# Patient Record
Sex: MALE | Race: WHITE | NOT HISPANIC OR LATINO | Employment: OTHER | URBAN - METROPOLITAN AREA
[De-identification: names, ages, dates, MRNs, and addresses within clinical notes are randomized per-mention and may not be internally consistent; named-entity substitution may affect disease eponyms.]

---

## 2023-04-22 ENCOUNTER — HOSPITAL ENCOUNTER (OUTPATIENT)
Facility: HOSPITAL | Age: 63
Setting detail: OBSERVATION
Discharge: HOME | End: 2023-04-24
Attending: EMERGENCY MEDICINE | Admitting: STUDENT IN AN ORGANIZED HEALTH CARE EDUCATION/TRAINING PROGRAM

## 2023-04-22 ENCOUNTER — APPOINTMENT (EMERGENCY)
Dept: RADIOLOGY | Facility: HOSPITAL | Age: 63
End: 2023-04-22

## 2023-04-22 ENCOUNTER — APPOINTMENT (OUTPATIENT)
Dept: NON INVASIVE DIAGNOSTICS | Facility: HOSPITAL | Age: 63
End: 2023-04-22

## 2023-04-22 DIAGNOSIS — N17.9 ACUTE KIDNEY INJURY (HCC): Primary | ICD-10-CM

## 2023-04-22 DIAGNOSIS — D72.829 LEUKOCYTOSIS: ICD-10-CM

## 2023-04-22 DIAGNOSIS — R06.00 DYSPNEA: ICD-10-CM

## 2023-04-22 DIAGNOSIS — E86.0 DEHYDRATION: ICD-10-CM

## 2023-04-22 DIAGNOSIS — R55 NEAR SYNCOPE: ICD-10-CM

## 2023-04-22 PROBLEM — R06.02 SOB (SHORTNESS OF BREATH): Status: ACTIVE | Noted: 2023-04-22

## 2023-04-22 PROBLEM — I10 HTN (HYPERTENSION): Status: ACTIVE | Noted: 2023-04-22

## 2023-04-22 PROBLEM — R65.10 SIRS (SYSTEMIC INFLAMMATORY RESPONSE SYNDROME) (HCC): Status: ACTIVE | Noted: 2023-04-22

## 2023-04-22 LAB
2HR DELTA HS TROPONIN: -1 NG/L
4HR DELTA HS TROPONIN: 0 NG/L
ALBUMIN SERPL BCP-MCNC: 4.7 G/DL (ref 3.5–5)
ALP SERPL-CCNC: 62 U/L (ref 34–104)
ALT SERPL W P-5'-P-CCNC: 44 U/L (ref 7–52)
ANION GAP SERPL CALCULATED.3IONS-SCNC: 11 MMOL/L (ref 4–13)
APTT PPP: 23 SECONDS (ref 23–37)
AST SERPL W P-5'-P-CCNC: 28 U/L (ref 13–39)
BACTERIA UR QL AUTO: ABNORMAL /HPF
BASOPHILS # BLD AUTO: 0.08 THOUSANDS/ΜL (ref 0–0.1)
BASOPHILS NFR BLD AUTO: 1 % (ref 0–1)
BILIRUB SERPL-MCNC: 0.49 MG/DL (ref 0.2–1)
BILIRUB UR QL STRIP: NEGATIVE
BNP SERPL-MCNC: 9 PG/ML (ref 0–100)
BUN SERPL-MCNC: 32 MG/DL (ref 5–25)
CALCIUM SERPL-MCNC: 10.3 MG/DL (ref 8.4–10.2)
CARDIAC TROPONIN I PNL SERPL HS: 13 NG/L
CARDIAC TROPONIN I PNL SERPL HS: 14 NG/L
CARDIAC TROPONIN I PNL SERPL HS: 14 NG/L
CHLORIDE SERPL-SCNC: 98 MMOL/L (ref 96–108)
CLARITY UR: CLEAR
CO2 SERPL-SCNC: 25 MMOL/L (ref 21–32)
COLOR UR: YELLOW
CREAT SERPL-MCNC: 2.37 MG/DL (ref 0.6–1.3)
D DIMER PPP FEU-MCNC: 0.58 UG/ML FEU
EOSINOPHIL # BLD AUTO: 0.05 THOUSAND/ΜL (ref 0–0.61)
EOSINOPHIL NFR BLD AUTO: 0 % (ref 0–6)
ERYTHROCYTE [DISTWIDTH] IN BLOOD BY AUTOMATED COUNT: 13.5 % (ref 11.6–15.1)
FLUAV RNA RESP QL NAA+PROBE: NEGATIVE
FLUBV RNA RESP QL NAA+PROBE: NEGATIVE
GFR SERPL CREATININE-BSD FRML MDRD: 28 ML/MIN/1.73SQ M
GLUCOSE SERPL-MCNC: 93 MG/DL (ref 65–140)
GLUCOSE UR STRIP-MCNC: NEGATIVE MG/DL
GRAN CASTS #/AREA URNS LPF: ABNORMAL /[LPF]
HCT VFR BLD AUTO: 43.8 % (ref 36.5–49.3)
HGB BLD-MCNC: 14 G/DL (ref 12–17)
HGB UR QL STRIP.AUTO: NEGATIVE
HYALINE CASTS #/AREA URNS LPF: ABNORMAL /LPF
IMM GRANULOCYTES # BLD AUTO: 0.15 THOUSAND/UL (ref 0–0.2)
IMM GRANULOCYTES NFR BLD AUTO: 1 % (ref 0–2)
INR PPP: 0.87 (ref 0.84–1.19)
KETONES UR STRIP-MCNC: NEGATIVE MG/DL
LEUKOCYTE ESTERASE UR QL STRIP: NEGATIVE
LYMPHOCYTES # BLD AUTO: 1.97 THOUSANDS/ΜL (ref 0.6–4.47)
LYMPHOCYTES NFR BLD AUTO: 12 % (ref 14–44)
MCH RBC QN AUTO: 29.1 PG (ref 26.8–34.3)
MCHC RBC AUTO-ENTMCNC: 32 G/DL (ref 31.4–37.4)
MCV RBC AUTO: 91 FL (ref 82–98)
MONOCYTES # BLD AUTO: 1.45 THOUSAND/ΜL (ref 0.17–1.22)
MONOCYTES NFR BLD AUTO: 9 % (ref 4–12)
MUCOUS THREADS UR QL AUTO: ABNORMAL
NEUTROPHILS # BLD AUTO: 13 THOUSANDS/ΜL (ref 1.85–7.62)
NEUTS SEG NFR BLD AUTO: 77 % (ref 43–75)
NITRITE UR QL STRIP: NEGATIVE
NON-SQ EPI CELLS URNS QL MICRO: ABNORMAL /HPF
NRBC BLD AUTO-RTO: 0 /100 WBCS
PH UR STRIP.AUTO: 5.5 [PH]
PLATELET # BLD AUTO: 282 THOUSANDS/UL (ref 149–390)
PMV BLD AUTO: 9.7 FL (ref 8.9–12.7)
POTASSIUM SERPL-SCNC: 5 MMOL/L (ref 3.5–5.3)
PROCALCITONIN SERPL-MCNC: 0.21 NG/ML
PROT SERPL-MCNC: 7.8 G/DL (ref 6.4–8.4)
PROT UR STRIP-MCNC: ABNORMAL MG/DL
PROTHROMBIN TIME: 12.5 SECONDS (ref 11.6–14.5)
RBC # BLD AUTO: 4.81 MILLION/UL (ref 3.88–5.62)
RBC #/AREA URNS AUTO: ABNORMAL /HPF
RSV RNA RESP QL NAA+PROBE: NEGATIVE
SARS-COV-2 RNA RESP QL NAA+PROBE: NEGATIVE
SODIUM SERPL-SCNC: 134 MMOL/L (ref 135–147)
SP GR UR STRIP.AUTO: 1.01 (ref 1–1.03)
UROBILINOGEN UR STRIP-ACNC: <2 MG/DL
WBC # BLD AUTO: 16.7 THOUSAND/UL (ref 4.31–10.16)
WBC #/AREA URNS AUTO: ABNORMAL /HPF

## 2023-04-22 RX ORDER — LISINOPRIL 20 MG/1
1 TABLET ORAL DAILY
COMMUNITY
Start: 2023-04-05

## 2023-04-22 RX ORDER — LANOLIN ALCOHOL/MO/W.PET/CERES
3 CREAM (GRAM) TOPICAL DAILY PRN
Status: DISCONTINUED | OUTPATIENT
Start: 2023-04-22 | End: 2023-04-24 | Stop reason: HOSPADM

## 2023-04-22 RX ORDER — SODIUM CHLORIDE, SODIUM GLUCONATE, SODIUM ACETATE, POTASSIUM CHLORIDE, MAGNESIUM CHLORIDE, SODIUM PHOSPHATE, DIBASIC, AND POTASSIUM PHOSPHATE .53; .5; .37; .037; .03; .012; .00082 G/100ML; G/100ML; G/100ML; G/100ML; G/100ML; G/100ML; G/100ML
500 INJECTION, SOLUTION INTRAVENOUS ONCE
Status: COMPLETED | OUTPATIENT
Start: 2023-04-22 | End: 2023-04-22

## 2023-04-22 RX ORDER — ONDANSETRON 2 MG/ML
4 INJECTION INTRAMUSCULAR; INTRAVENOUS EVERY 6 HOURS PRN
Status: DISCONTINUED | OUTPATIENT
Start: 2023-04-22 | End: 2023-04-24 | Stop reason: HOSPADM

## 2023-04-22 RX ORDER — ACETAMINOPHEN 325 MG/1
650 TABLET ORAL EVERY 6 HOURS PRN
Status: DISCONTINUED | OUTPATIENT
Start: 2023-04-22 | End: 2023-04-24 | Stop reason: HOSPADM

## 2023-04-22 RX ORDER — SODIUM CHLORIDE, SODIUM GLUCONATE, SODIUM ACETATE, POTASSIUM CHLORIDE, MAGNESIUM CHLORIDE, SODIUM PHOSPHATE, DIBASIC, AND POTASSIUM PHOSPHATE .53; .5; .37; .037; .03; .012; .00082 G/100ML; G/100ML; G/100ML; G/100ML; G/100ML; G/100ML; G/100ML
125 INJECTION, SOLUTION INTRAVENOUS CONTINUOUS
Status: DISCONTINUED | OUTPATIENT
Start: 2023-04-22 | End: 2023-04-22

## 2023-04-22 RX ORDER — SODIUM CHLORIDE, SODIUM GLUCONATE, SODIUM ACETATE, POTASSIUM CHLORIDE, MAGNESIUM CHLORIDE, SODIUM PHOSPHATE, DIBASIC, AND POTASSIUM PHOSPHATE .53; .5; .37; .037; .03; .012; .00082 G/100ML; G/100ML; G/100ML; G/100ML; G/100ML; G/100ML; G/100ML
125 INJECTION, SOLUTION INTRAVENOUS CONTINUOUS
Status: DISPENSED | OUTPATIENT
Start: 2023-04-22 | End: 2023-04-23

## 2023-04-22 RX ORDER — HEPARIN SODIUM 5000 [USP'U]/ML
5000 INJECTION, SOLUTION INTRAVENOUS; SUBCUTANEOUS EVERY 8 HOURS SCHEDULED
Status: DISCONTINUED | OUTPATIENT
Start: 2023-04-23 | End: 2023-04-24 | Stop reason: HOSPADM

## 2023-04-22 RX ADMIN — SODIUM CHLORIDE, SODIUM GLUCONATE, SODIUM ACETATE, POTASSIUM CHLORIDE, MAGNESIUM CHLORIDE, SODIUM PHOSPHATE, DIBASIC, AND POTASSIUM PHOSPHATE 500 ML: .53; .5; .37; .037; .03; .012; .00082 INJECTION, SOLUTION INTRAVENOUS at 19:50

## 2023-04-22 RX ADMIN — SODIUM CHLORIDE, SODIUM GLUCONATE, SODIUM ACETATE, POTASSIUM CHLORIDE, MAGNESIUM CHLORIDE, SODIUM PHOSPHATE, DIBASIC, AND POTASSIUM PHOSPHATE 125 ML/HR: .53; .5; .37; .037; .03; .012; .00082 INJECTION, SOLUTION INTRAVENOUS at 21:40

## 2023-04-22 RX ADMIN — SODIUM CHLORIDE 1000 ML: 0.9 INJECTION, SOLUTION INTRAVENOUS at 16:05

## 2023-04-22 RX ADMIN — SODIUM CHLORIDE, SODIUM GLUCONATE, SODIUM ACETATE, POTASSIUM CHLORIDE, MAGNESIUM CHLORIDE, SODIUM PHOSPHATE, DIBASIC, AND POTASSIUM PHOSPHATE 125 ML/HR: .53; .5; .37; .037; .03; .012; .00082 INJECTION, SOLUTION INTRAVENOUS at 18:31

## 2023-04-22 NOTE — H&P
[place]  H&P  Name: Eliot Carter 58 y o  male I MRN: [id number]  Unit/Bed#: -01 I Date of Admission: 4/22/2023   Date of Service: 4/22/2023 I Hospital Day: 0      Assessment/Plan   * SOB (shortness of breath)  Assessment & Plan  · Presented to ED after becoming lightheaded with SOB while mowing lawn today  Sx resolved, stable on RA  · Covid/flu/rsv pending   · Meeting SIRS however suspect due to dehydration > infection   · Initial trop WNL, continue trend   · BNP WNL   · D dimer slightly elevated to 0 58 however with age adjustment, low likelihood VTE  · No clinical suspicion for D dimer, hold off on VQ scan   · Suspect symptoms due to pre-syncope in setting of acute dehydration with SHITAL, continue IV hydration    SHITAL (acute kidney injury) ([facility] [address] )  Assessment & Plan  ·  Baseline Cr 1-1 1  · Cr on admission 2 37  · Suspected pre-renal etiology/acute dehydration  · UA pending   · IVF  · Hold lisinopril   · retention protocol/bladder scan  · If no improvement with IVF consider renal US    SIRS (systemic inflammatory response syndrome) ([facility])  Assessment & Plan  · POA, SIRS; WBC 16, tachycardia   · No identifiable source currently, does not meet sepsis  · procal pending  · monitor off abx  · CXR pending final read however no evidence infection   · UA pending     HTN (hypertension)  Assessment & Plan  · Hold home lisinopril 2/2 SHITAL       VTE Pharmacologic Prophylaxis: VTE Score: 3 Moderate Risk (Score 3-4) - Pharmacological DVT Prophylaxis Ordered: heparin  Code Status: Level 1 - Full Code per patient   Discussion with family: Updated  (wife) at bedside  Anticipated Length of Stay: Patient will be admitted on an observation basis with an anticipated length of stay of less than 2 midnights secondary to IVF      Total Time Spent on Date of Encounter in care of patient: 65 minutes This time was spent on one or more of the following: performing physical exam; counseling "and coordination of care; obtaining or reviewing history; documenting in the medical record; reviewing/ordering tests, medications or procedures; communicating with other healthcare professionals and discussing with patient's family/caregivers  Chief Complaint: SOB, lightheadedness    History of Present Illness:  Amaris Staley is a 58 y o  male with a PMH of HTN, obesity who presents with new onset lightheadedness with associated shortness of breath and neck pain which began while patient was mowing the lawn today  Patient states he woke up in normal state of health this morning, ate breakfast, began mowing the lawn earlier in the day and was initially feeling fine, states he was trying to keep hydrated and wife brought patient Gatorade while taking a break from mowing  Patient states as he continued mowing lawn, he began to feel lightheaded/dizzy, visual changes including \" everything looked very light/bright\", and associated nausea without vomiting  He notes he also began feeling short of breath at which time wife took patient to urgent care who recommended ED  Shortness of breath and dizziness resolved with cessation of activity  Patient denies associated chest pain, palpitations, fevers or chills, recent sickness, vomiting, diarrhea, dysuria, blood in urine, flank pain, numbness/tingling/weakness in extremities or additional complaints  Currently in ED, patient states he feels mildly nauseous/hungry as he has not eaten all day  He denies any history of renal disease in the past, he recently had lab work done routinely with PCP at which time renal function was normal      Review of Systems:  Review of Systems   Constitutional: Positive for fatigue  Negative for chills and fever  HENT: Negative for congestion, rhinorrhea and sore throat  Eyes: Negative for visual disturbance  Respiratory: Positive for shortness of breath  Negative for cough, chest tightness and wheezing      Cardiovascular: " Negative for chest pain, palpitations and leg swelling  Gastrointestinal: Positive for nausea  Negative for abdominal pain, constipation, diarrhea and vomiting  Genitourinary: Negative for difficulty urinating, dysuria and frequency  Musculoskeletal: Positive for neck pain  Negative for arthralgias and myalgias  Skin: Negative for rash and wound  Neurological: Positive for dizziness and light-headedness  Negative for tremors, seizures, syncope, speech difficulty, weakness, numbness and headaches  All other systems reviewed and are negative  Past Medical and Surgical History:   History reviewed  No pertinent past medical history  History reviewed  No pertinent surgical history  Meds/Allergies:  Prior to Admission medications    Medication Sig Start Date End Date Taking? Authorizing Provider   lisinopril (ZESTRIL) 20 mg tablet Take 1 tablet by mouth daily 4/5/23  Yes Historical Provider, MD   Ascorbic Acid, Vitamin C, (VITAMIN C) 100 MG tablet Take 100 mg by mouth daily    Historical Provider, MD     I have reviewed home medications with patient personally  Allergies: Allergies   Allergen Reactions   • Penicillin G Hives       Social History:  Marital Status: /Civil Union   Occupation: Not assessed  Patient Pre-hospital Living Situation: Home, With spouse  Patient Pre-hospital Level of Mobility: walks  Patient Pre-hospital Diet Restrictions: No restrictions  Substance Use History:   Social History     Substance and Sexual Activity   Alcohol Use Not Currently     Social History     Tobacco Use   Smoking Status Never   Smokeless Tobacco Never     Social History     Substance and Sexual Activity   Drug Use Not Currently       Family History:  History reviewed  No pertinent family history      Physical Exam:     Vitals:   Blood Pressure: 126/66 (04/22/23 1800)  Pulse: 93 (04/22/23 1800)  Temperature: 97 7 °F (36 5 °C) (04/22/23 1544)  Temp Source: Oral (04/22/23 1544)  Respirations: 19 "(04/22/23 1800)  Height: 5' 10\" (177 8 cm) (04/22/23 1541)  Weight - Scale: 120 kg (265 lb) (04/22/23 1541)  SpO2: 95 % (04/22/23 1800)    Physical Exam  Vitals and nursing note reviewed  Constitutional:       General: He is not in acute distress  Appearance: He is well-developed  He is obese  He is not ill-appearing  HENT:      Head: Normocephalic and atraumatic  Eyes:      General:         Right eye: No discharge  Left eye: No discharge  Extraocular Movements: Extraocular movements intact  Conjunctiva/sclera: Conjunctivae normal    Cardiovascular:      Rate and Rhythm: Normal rate and regular rhythm  Heart sounds: No murmur heard  Pulmonary:      Effort: Pulmonary effort is normal  No respiratory distress  Breath sounds: Normal breath sounds  No wheezing, rhonchi or rales  Comments: Stable on room air, no tachypnea or increased work of breathing  Abdominal:      General: Bowel sounds are normal  There is no distension  Palpations: Abdomen is soft  Tenderness: There is no abdominal tenderness  Musculoskeletal:      Cervical back: Neck supple  No rigidity or tenderness  Right lower leg: No edema  Left lower leg: No edema  Skin:     General: Skin is warm and dry  Capillary Refill: Capillary refill takes less than 2 seconds  Neurological:      General: No focal deficit present  Mental Status: He is alert and oriented to person, place, and time  Mental status is at baseline  Cranial Nerves: No cranial nerve deficit     Psychiatric:         Mood and Affect: Mood normal          Behavior: Behavior normal           Additional Data:     Lab Results:  Results from last 7 days   Lab Units 04/22/23  1604   WBC Thousand/uL 16 70*   HEMOGLOBIN g/dL 14 0   HEMATOCRIT % 43 8   PLATELETS Thousands/uL 282   NEUTROS PCT % 77*   LYMPHS PCT % 12*   MONOS PCT % 9   EOS PCT % 0     Results from last 7 days   Lab Units 04/22/23  1604   SODIUM mmol/L 134* " POTASSIUM mmol/L 5 0   CHLORIDE mmol/L 98   CO2 mmol/L 25   BUN mg/dL 32*   CREATININE mg/dL 2 37*   ANION GAP mmol/L 11   CALCIUM mg/dL 10 3*   ALBUMIN g/dL 4 7   TOTAL BILIRUBIN mg/dL 0 49   ALK PHOS U/L 62   ALT U/L 44   AST U/L 28   GLUCOSE RANDOM mg/dL 93     Results from last 7 days   Lab Units 04/22/23  1604   INR  0 87                   Lines/Drains:  Invasive Devices     Peripheral Intravenous Line  Duration           Peripheral IV 04/22/23 Left Antecubital <1 day                    Imaging: Personally reviewed the following imaging: chest xray  XR chest 1 view portable   ED Interpretation by Esdras Valdivia DO (04/22 1655)   No acute infiltrate or congestive heart failure          EKG and Other Studies Reviewed on Admission:   · EKG: Sinus Tachycardia    ** Please Note: This note has been constructed using a voice recognition system   **

## 2023-04-22 NOTE — PLAN OF CARE
Problem: PAIN - ADULT  Goal: Verbalizes/displays adequate comfort level or baseline comfort level  Description: Interventions:  - Encourage patient to monitor pain and request assistance  - Assess pain using appropriate pain scale  - Administer analgesics based on type and severity of pain and evaluate response  - Implement non-pharmacological measures as appropriate and evaluate response  - Consider cultural and social influences on pain and pain management  - Notify physician/advanced practitioner if interventions unsuccessful or patient reports new pain  Outcome: Progressing     Problem: INFECTION - ADULT  Goal: Absence or prevention of progression during hospitalization  Description: INTERVENTIONS:  - Assess and monitor for signs and symptoms of infection  - Monitor lab/diagnostic results  - Monitor all insertion sites, i e  indwelling lines, tubes, and drains  - Administer medications as ordered  - Instruct and encourage patient and family to use good hand hygiene technique  - Identify and instruct in appropriate isolation precautions for identified infection/condition  Outcome: Progressing     Problem: SAFETY ADULT  Goal: Patient will remain free of falls  Description: INTERVENTIONS:  - Educate patient/family on patient safety including physical limitations  - Instruct patient to call for assistance with activity   - Consult OT/PT to assist with strengthening/mobility   - Keep Call bell within reach  - Keep bed low and locked with side rails adjusted as appropriate  - Keep care items and personal belongings within reach  - Initiate and maintain comfort rounds  - Make Fall Risk Sign visible to staff  - Offer Toileting every 2 Hours, in advance of need  - Apply yellow socks and bracelet for high fall risk patients  - Consider moving patient to room near nurses station  Outcome: Progressing  Goal: Maintain or return to baseline ADL function  Description: INTERVENTIONS:  -  Assess patient's ability to carry out ADLs; assess patient's baseline for ADL function and identify physical deficits which impact ability to perform ADLs (bathing, care of mouth/teeth, toileting, grooming, dressing, etc )  - Assess/evaluate cause of self-care deficits   - Assess range of motion  - Assess patient's mobility; develop plan if impaired  - Assess patient's need for assistive devices and provide as appropriate  - Encourage maximum independence but intervene and supervise when necessary  - Involve family in performance of ADLs  - Assess for home care needs following discharge   - Consider OT consult to assist with ADL evaluation and planning for discharge  - Provide patient education as appropriate  Outcome: Progressing  Goal: Maintains/Returns to pre admission functional level  Description: INTERVENTIONS:  - Perform BMAT or MOVE assessment daily    - Set and communicate daily mobility goal to care team and patient/family/caregiver  - Collaborate with rehabilitation services on mobility goals if consulted  - Perform Range of Motion 3 times a day  - Reposition patient every 2 hours    - Dangle patient 3 times a day  - Stand patient 3 times a day  - Ambulate patient 3 times a day  - Out of bed to chair 3 times a day   - Out of bed for meals 3 times a day  - Out of bed for toileting  - Record patient progress and toleration of activity level   Outcome: Progressing     Problem: DISCHARGE PLANNING  Goal: Discharge to home or other facility with appropriate resources  Description: INTERVENTIONS:  - Identify barriers to discharge w/patient and caregiver  - Arrange for needed discharge resources and transportation as appropriate  - Identify discharge learning needs (meds, wound care, etc )  - Arrange for interpretive services to assist at discharge as needed  - Refer to Case Management Department for coordinating discharge planning if the patient needs post-hospital services based on physician/advanced practitioner order or complex needs related to functional status, cognitive ability, or social support system  Outcome: Progressing

## 2023-04-22 NOTE — ASSESSMENT & PLAN NOTE
· POA, SIRS; WBC 16, tachycardia   · No identifiable source currently, does not meet sepsis  · procal pending  · monitor off abx  · CXR pending final read however no evidence infection   · UA pending

## 2023-04-22 NOTE — ASSESSMENT & PLAN NOTE
· Presented to ED after becoming lightheaded with SOB while mowing lawn today  Sx resolved, stable on RA    · Covid/flu/rsv pending   · Meeting SIRS however suspect due to dehydration > infection   · Initial trop WNL, continue trend   · BNP WNL   · D dimer slightly elevated to 0 58 however with age adjustment, low likelihood VTE  · No clinical suspicion for D dimer, hold off on VQ scan   · Suspect symptoms due to pre-syncope in setting of acute dehydration with SHITAL, continue IV hydration

## 2023-04-22 NOTE — ASSESSMENT & PLAN NOTE
·  Baseline Cr 1-1 1  · Cr on admission 2 37  · Suspected pre-renal etiology/acute dehydration  · UA pending   · IVF  · Hold lisinopril   · retention protocol/bladder scan  · If no improvement with IVF consider renal US

## 2023-04-22 NOTE — ED PROVIDER NOTES
History  Chief Complaint   Patient presents with   • Shortness of Breath     Patient presents to the ED with c/o SOB and lightheadedness while mowing the grass today      This is a 72-year-old male with a history of hypertension hypercholesterolemia who presents for evaluation of shortness of breath nausea and upper back pain that started while cutting the grass  Denies any chest pain no vomiting or diarrhea  No recent cough or fever  Denies any insect bites or skin rashes  He is a non-smoker with clear lungs on examination  No prior history of DVT or PE  No unilateral leg swelling and room air saturation is 97%  Patient felt lightheaded and like he was about to pass out but did not lose any consciousness      History provided by:  Patient  Medical Problem  Location:  Generalized  Quality:  Weakness  Severity:  Moderate  Onset quality:  Sudden  Duration:  30 minutes  Timing:  Constant  Progression:  Improving  Chronicity:  New  Context:  Generalized weakness shortness of breath and upper back pain while cutting grass  Associated symptoms: nausea and shortness of breath    Associated symptoms: no chest pain        Prior to Admission Medications   Prescriptions Last Dose Informant Patient Reported? Taking? Ascorbic Acid, Vitamin C, (VITAMIN C) 100 MG tablet 4/22/2023  Yes Yes   Sig: Take 100 mg by mouth daily   lisinopril (ZESTRIL) 20 mg tablet 4/22/2023  Yes Yes   Sig: Take 1 tablet by mouth daily      Facility-Administered Medications: None       Past Medical History:   Diagnosis Date   • Hypertension        History reviewed  No pertinent surgical history  Family History   Problem Relation Age of Onset   • Diabetes Mother    • Diabetes Maternal Grandmother      I have reviewed and agree with the history as documented      E-Cigarette/Vaping     E-Cigarette/Vaping Substances     Social History     Tobacco Use   • Smoking status: Never   • Smokeless tobacco: Never   Substance Use Topics   • Alcohol use: Not Currently   • Drug use: Not Currently       Review of Systems   Respiratory: Positive for shortness of breath  Cardiovascular: Negative for chest pain  Gastrointestinal: Positive for nausea  Musculoskeletal: Positive for neck pain  All other systems reviewed and are negative  Physical Exam  Physical Exam  Vitals and nursing note reviewed  Constitutional:       General: He is not in acute distress  Appearance: He is not ill-appearing, toxic-appearing or diaphoretic  HENT:      Head: Normocephalic and atraumatic  Right Ear: Tympanic membrane, ear canal and external ear normal       Left Ear: Tympanic membrane, ear canal and external ear normal       Mouth/Throat:      Mouth: Mucous membranes are dry  Eyes:      General:         Right eye: No discharge  Left eye: No discharge  Extraocular Movements: Extraocular movements intact  Pupils: Pupils are equal, round, and reactive to light  Cardiovascular:      Rate and Rhythm: Regular rhythm  Tachycardia present  Pulses: Normal pulses  Heart sounds: No murmur heard  No gallop  Pulmonary:      Effort: Pulmonary effort is normal  No respiratory distress  Breath sounds: No stridor  No wheezing, rhonchi or rales  Abdominal:      General: There is no distension  Palpations: Abdomen is soft  Tenderness: There is no abdominal tenderness  There is no guarding or rebound  Musculoskeletal:         General: No swelling, tenderness, deformity or signs of injury  Normal range of motion  Cervical back: Neck supple  No rigidity  Right lower leg: No edema  Left lower leg: No edema  Skin:     General: Skin is warm and dry  Findings: No erythema or rash  Neurological:      General: No focal deficit present  Mental Status: He is alert and oriented to person, place, and time  Cranial Nerves: No cranial nerve deficit  Sensory: No sensory deficit  Motor: No weakness  Coordination: Coordination normal    Psychiatric:         Thought Content:  Thought content normal       Comments: Anxious         Vital Signs  ED Triage Vitals   Temperature Pulse Respirations Blood Pressure SpO2   04/22/23 1544 04/22/23 1542 04/22/23 1542 04/22/23 1542 04/22/23 1542   97 7 °F (36 5 °C) (!) 107 22 118/69 97 %      Temp Source Heart Rate Source Patient Position - Orthostatic VS BP Location FiO2 (%)   04/22/23 1544 04/22/23 1542 04/22/23 1545 04/22/23 1545 --   Oral Monitor Lying Right arm       Pain Score       04/22/23 1541       No Pain           Vitals:    04/22/23 1730 04/22/23 1800 04/22/23 1823 04/22/23 1934   BP: 125/67 126/66 143/96 136/84   Pulse: 96 93 91    Patient Position - Orthostatic VS: Sitting Sitting Sitting          Visual Acuity      ED Medications  Medications   heparin (porcine) subcutaneous injection 5,000 Units (has no administration in time range)   ondansetron (ZOFRAN) injection 4 mg (has no administration in time range)   acetaminophen (TYLENOL) tablet 650 mg (has no administration in time range)   melatonin tablet 3 mg (has no administration in time range)   multi-electrolyte (PLASMALYTE-A/ISOLYTE-S PH 7 4) IV solution (125 mL/hr Intravenous New Bag 4/22/23 2140)   sodium chloride 0 9 % bolus 1,000 mL (0 mL Intravenous Stopped 4/22/23 1919)   multi-electrolyte (PLASMALYTE-A/ISOLYTE-S PH 7 4) IV solution 500 mL (0 mL Intravenous Stopped 4/22/23 2137)       Diagnostic Studies  Results Reviewed     Procedure Component Value Units Date/Time    UA w Reflex to Microscopic w Reflex to Culture [[id number]]  (Abnormal) Collected: 04/22/23 1942    Lab Status: Final result Specimen: Urine, Clean Catch Updated: 04/22/23 2014     Color, UA Yellow     Clarity, UA Clear     Specific Gravity, UA 1 015     pH, UA 5 5     Leukocytes, UA Negative     Nitrite, UA Negative     Protein, UA 30 (1+) mg/dl      Glucose, UA Negative mg/dl      Ketones, UA Negative mg/dl      Urobilinogen, UA <2 0 mg/dl      Bilirubin, UA Negative     Occult Blood, UA Negative    HS Troponin I 4hr [[id number]]  (Normal) Collected: 04/22/23 1931    Lab Status: Final result Specimen: Blood from Arm, Right Updated: 04/22/23 2010     hs TnI 4hr 14 ng/L      Delta 4hr hsTnI 0 ng/L     COVID/FLU/RSV [[id number]]  (Normal) Collected: 04/22/23 1802    Lab Status: Final result Specimen: Nares from Nasopharyngeal Swab Updated: 04/22/23 1849     SARS-CoV-2 Negative     INFLUENZA A PCR Negative     INFLUENZA B PCR Negative     RSV PCR Negative    Narrative:      FOR PEDIATRIC PATIENTS - copy/paste COVID Guidelines URL to browser: [email or web address]    SARS-CoV-2 assay is a Nucleic Acid Amplification assay intended for the  qualitative detection of nucleic acid from SARS-CoV-2 in nasopharyngeal  swabs  Results are for the presumptive identification of SARS-CoV-2 RNA  Positive results are indicative of infection with SARS-CoV-2, the virus  causing COVID-19, but do not rule out bacterial infection or co-infection  with other viruses  Laboratories within the [place] and its  territories are required to report all positive results to the appropriate  public health authorities  Negative results do not preclude SARS-CoV-2  infection and should not be used as the sole basis for treatment or other  patient management decisions  Negative results must be combined with  clinical observations, patient history, and epidemiological information  This test has not been FDA cleared or approved  This test has been authorized by FDA under an Emergency Use Authorization  (EUA)  This test is only authorized for the duration of time the  declaration that circumstances exist justifying the authorization of the  emergency use of an in vitro diagnostic tests for detection of SARS-CoV-2  virus and/or diagnosis of COVID-19 infection under section 564(b)(1) of  the Act, 21 U  S C  942GRH-4(X)(2), unless the authorization is terminated  or revoked sooner  The test has been validated but independent review by FDA  and CLIA is pending  Test performed using [employer] Gene[employer]pert: This RT-PCR assay targets N2,  a region unique to SARS-CoV-2  A conserved region in the E-gene was chosen  for pan-Sarbecovirus detection which includes SARS-CoV-2  According to CMS-2020-01-R, this platform meets the definition of high-throughput technology      Procalcitonin [[id number]]  (Normal) Collected: 04/22/23 1604    Lab Status: Final result Specimen: Blood from Arm, Left Updated: 04/22/23 1842     Procalcitonin 0 21 ng/ml     HS Troponin I 2hr [[id number]]  (Normal) Collected: 04/22/23 1755    Lab Status: Final result Specimen: Blood from Hand, Left Updated: 04/22/23 1826     hs TnI 2hr 13 ng/L      Delta 2hr hsTnI -1 ng/L     HS Troponin 0hr (reflex protocol) [[id number]]  (Normal) Collected: 04/22/23 1604    Lab Status: Final result Specimen: Blood from Arm, Left Updated: 04/22/23 1633     hs TnI 0hr 14 ng/L     B-Type Natriuretic Peptide(BNP) [[id number]]  (Normal) Collected: 04/22/23 1604    Lab Status: Final result Specimen: Blood from Arm, Left Updated: 04/22/23 1633     BNP 9 pg/mL     Comprehensive metabolic panel [[id number]]  (Abnormal) Collected: 04/22/23 1604    Lab Status: Final result Specimen: Blood from Arm, Left Updated: 04/22/23 1629     Sodium 134 mmol/L      Potassium 5 0 mmol/L      Chloride 98 mmol/L      CO2 25 mmol/L      ANION GAP 11 mmol/L      BUN 32 mg/dL      Creatinine 2 37 mg/dL      Glucose 93 mg/dL      Calcium 10 3 mg/dL      AST 28 U/L      ALT 44 U/L      Alkaline Phosphatase 62 U/L      Total Protein 7 8 g/dL      Albumin 4 7 g/dL      Total Bilirubin 0 49 mg/dL      eGFR 28 ml/min/1 73sq m     Narrative:      [facility] guidelines for Chronic Kidney Disease (CKD):   •  Stage 1 with normal or high GFR (GFR > 90 mL/min/1 73 square meters)  •  Stage 2 Mild CKD (GFR = 60-89 mL/min/1 73 square meters)  •  Stage 3A Moderate CKD (GFR = 45-59 mL/min/1 73 square meters)  •  Stage 3B Moderate CKD (GFR = 30-44 mL/min/1 73 square meters)  •  Stage 4 Severe CKD (GFR = 15-29 mL/min/1 73 square meters)  •  Stage 5 End Stage CKD (GFR <15 mL/min/1 73 square meters)  Note: GFR calculation is accurate only with a steady state creatinine    D-Dimer [[id number]]  (Abnormal) Collected: 04/22/23 1604    Lab Status: Final result Specimen: Blood from Arm, Left Updated: 04/22/23 1628     D-Dimer, Quant 0 58 ug/ml FEU     Narrative: In the evaluation for possible pulmonary embolism, in the appropriate (Well's Score of 4 or less) patient, the age adjusted d-dimer cutoff for this patient can be calculated as:    Age x 0 01 (in ug/mL) for Age-adjusted D-dimer exclusion threshold for a patient over 50 years      Protime-INR [[id number]]  (Normal) Collected: 04/22/23 1604    Lab Status: Final result Specimen: Blood from Arm, Left Updated: 04/22/23 1625     Protime 12 5 seconds      INR 0 87    APTT [[id number]]  (Normal) Collected: 04/22/23 1604    Lab Status: Final result Specimen: Blood from Arm, Left Updated: 04/22/23 1625     PTT 23 seconds     CBC and differential [[id number]]  (Abnormal) Collected: 04/22/23 1604    Lab Status: Final result Specimen: Blood from Arm, Left Updated: 04/22/23 1612     WBC 16 70 Thousand/uL      RBC 4 81 Million/uL      Hemoglobin 14 0 g/dL      Hematocrit 43 8 %      MCV 91 fL      MCH 29 1 pg      MCHC 32 0 g/dL      RDW 13 5 %      MPV 9 7 fL      Platelets 277 Thousands/uL      nRBC 0 /100 WBCs      Neutrophils Relative 77 %      Immat GRANS % 1 %      Lymphocytes Relative 12 %      Monocytes Relative 9 %      Eosinophils Relative 0 %      Basophils Relative 1 %      Neutrophils Absolute 13 00 Thousands/µL      Immature Grans Absolute 0 15 Thousand/uL      Lymphocytes Absolute 1 97 Thousands/µL      Monocytes Absolute 1 45 Thousand/µL      Eosinophils Absolute 0 05 Thousand/µL      Basophils Absolute 0 08 Thousands/µL                  XR chest 1 view portable   ED Interpretation by Iqra Szymanski DO (04/22 1655)   No acute infiltrate or congestive heart failure      Final Result by Satinder Bradshaw MD (04/22 2024)      No acute cardiopulmonary disease  Workstation performed: [id number]         VAS lower limb venous duplex study, complete bilateral    (Results Pending)              Procedures  ECG 12 Lead Documentation Only    Date/Time: 4/22/2023 3:55 PM  Performed by: Iqra Szymanski DO  Authorized by: Iqra Szymanski DO     ECG reviewed by me, the ED Provider: yes    Patient location:  ED  Rate:     ECG rate:  106    ECG rate assessment: tachycardic    Rhythm:     Rhythm: sinus rhythm    Ectopy:     Ectopy: PVCs    Conduction:     Conduction: normal    T waves:     T waves: normal               ED Course  ED Course as of 04/22/23 2211   Sat Apr 22, 2023   1706 Patient's creatinine from 1 month ago was 1 01 according to old records from [facility]  1707 Leukocytosis is most likely a stress reaction   1708 Elevated creatinine may be dehydrational   1708 Age-adjusted D-dimer negative    Will discuss with medicine             HEART Risk Score    Flowsheet Row Most Recent Value   Heart Score Risk Calculator    History 1 Filed at: 04/22/2023 1652   ECG 0 Filed at: 04/22/2023 1652   Age 1 Filed at: 04/22/2023 1652   Risk Factors 1 Filed at: 04/22/2023 1652   Troponin 1 Filed at: 04/22/2023 1652   HEART Score 4 Filed at: 04/22/2023 1652                PERC Rule for PE    Flowsheet Row Most Recent Value   PERC Rule for PE    Age >=50 1 Filed at: 04/22/2023 1554   HR >=100 1 Filed at: 04/22/2023 1554   O2 Sat on room air < 95% 0 Filed at: 04/22/2023 1554   History of PE or DVT 0 Filed at: 04/22/2023 1554   Recent trauma or surgery 0 Filed at: 04/22/2023 1554   Hemoptysis 0 Filed at: 04/22/2023 1554   Exogenous estrogen 0 Filed at: 04/22/2023 1554 Unilateral leg swelling 0 Filed at: 04/22/2023 1554   PERC Rule for PE Results 2 Filed at: 04/22/2023 1554              SBIRT 22yo+    Flowsheet Row Most Recent Value   Initial Alcohol Screen: US AUDIT-C     1  How often do you have a drink containing alcohol? 0 Filed at: 04/22/2023 1543   2  How many drinks containing alcohol do you have on a typical day you are drinking? 0 Filed at: 04/22/2023 1543   3a  Male UNDER 65: How often do you have five or more drinks on one occasion? 0 Filed at: 04/22/2023 1543   3b  FEMALE Any Age, or MALE 65+: How often do you have 4 or more drinks on one occassion? 0 Filed at: 04/22/2023 1543   Audit-C Score 0 Filed at: 04/22/2023 1543   ERENDIRA: How many times in the past year have you    Used an illegal drug or used a prescription medication for non-medical reasons? Never Filed at: 04/22/2023 1543          Wells' Criteria for PE    Flowsheet Row Most Recent Value   Wells' Criteria for PE    Clinical signs and symptoms of DVT 0 Filed at: 04/22/2023 1554   PE is primary diagnosis or equally likely 0 Filed at: 04/22/2023 1554   HR >100 1 5 Filed at: 04/22/2023 1554   Immobilization at least 3 days or Surgery in the previous 4 weeks 0 Filed at: 04/22/2023 1554   Previous, objectively diagnosed PE or DVT 0 Filed at: 04/22/2023 1554   Hemoptysis 0 Filed at: 04/22/2023 1554   Malignancy with treatment within 6 months or palliative 0 Filed at: 04/22/2023 1554   Wells' Criteria Total 1 5 Filed at: 04/22/2023 1554                Medical Decision Making  Shortness of breath and nausea rule out atypical cardiac disease versus anxiety dehydration  Low criteria for PE by Wells criteria but does not pass PERC so will check D-dimer  Amount and/or Complexity of Data Reviewed  Labs: ordered  Radiology: ordered            Disposition  Final diagnoses:   Acute kidney injury ([address] )   Dehydration   Near syncope   Dyspnea   Leukocytosis     Time reflects when diagnosis was documented in both MDM as applicable and the Disposition within this note     Time User Action Codes Description Comment    4/22/2023  5:52 PM Alberta Rianna Add [N17 9] Acute kidney injury ([address] )     4/22/2023  5:52 PM Alberta Rianna Add [E86 0] Dehydration     4/22/2023  5:52 PM Alberta Rianna Add [R55] Near syncope     4/22/2023  5:52 PM Alberta Rianna Add [R06 00] Dyspnea     4/22/2023  5:53 PM Alberta Rianna Add [Q20 306] Leukocytosis       ED Disposition     ED Disposition   Admit    Condition   Stable    Date/Time   Sat Apr 22, 2023  5:54 PM    Comment   Case was discussed with **Dr Yokasta Slater* and the patient's admission status was agreed to be Admission Status: observation status to the service of Dr Yokasta Slater   Follow-up Information    None         Current Discharge Medication List      CONTINUE these medications which have NOT CHANGED    Details   Ascorbic Acid, Vitamin C, (VITAMIN C) 100 MG tablet Take 100 mg by mouth daily      lisinopril (ZESTRIL) 20 mg tablet Take 1 tablet by mouth daily             No discharge procedures on file      PDMP Review     None          ED Provider  Electronically Signed by           Antonina Hernández DO  04/22/23 9095

## 2023-04-22 NOTE — PLAN OF CARE
Problem: PAIN - ADULT  Goal: Verbalizes/displays adequate comfort level or baseline comfort level  Description: Interventions:  - Encourage patient to monitor pain and request assistance  - Assess pain using appropriate pain scale  - Administer analgesics based on type and severity of pain and evaluate response  - Implement non-pharmacological measures as appropriate and evaluate response  - Consider cultural and social influences on pain and pain management  - Notify physician/advanced practitioner if interventions unsuccessful or patient reports new pain  Outcome: Progressing     Problem: INFECTION - ADULT  Goal: Absence or prevention of progression during hospitalization  Description: INTERVENTIONS:  - Assess and monitor for signs and symptoms of infection  - Monitor lab/diagnostic results  - Monitor all insertion sites, i e  indwelling lines, tubes, and drains  - Administer medications as ordered  - Instruct and encourage patient and family to use good hand hygiene technique  - Identify and instruct in appropriate isolation precautions for identified infection/condition  Outcome: Progressing     Problem: SAFETY ADULT  Goal: Patient will remain free of falls  Description: INTERVENTIONS:  - Educate patient/family on patient safety including physical limitations  - Instruct patient to call for assistance with activity   - Consult OT/PT to assist with strengthening/mobility   - Keep Call bell within reach  - Keep bed low and locked with side rails adjusted as appropriate  - Keep care items and personal belongings within reach  - Initiate and maintain comfort rounds  - Make Fall Risk Sign visible to staff  - Offer Toileting every 2 Hours, in advance of need  - Apply yellow socks and bracelet for high fall risk patients  - Consider moving patient to room near nurses station  Outcome: Progressing  Goal: Maintain or return to baseline ADL function  Description: INTERVENTIONS:  -  Assess patient's ability to carry out ADLs; assess patient's baseline for ADL function and identify physical deficits which impact ability to perform ADLs (bathing, care of mouth/teeth, toileting, grooming, dressing, etc )  - Assess/evaluate cause of self-care deficits   - Assess range of motion  - Assess patient's mobility; develop plan if impaired  - Assess patient's need for assistive devices and provide as appropriate  - Encourage maximum independence but intervene and supervise when necessary  - Involve family in performance of ADLs  - Assess for home care needs following discharge   - Consider OT consult to assist with ADL evaluation and planning for discharge  - Provide patient education as appropriate  Outcome: Progressing  Goal: Maintains/Returns to pre admission functional level  Description: INTERVENTIONS:  - Perform BMAT or MOVE assessment daily    - Set and communicate daily mobility goal to care team and patient/family/caregiver  - Collaborate with rehabilitation services on mobility goals if consulted  - Perform Range of Motion 3 times a day  - Reposition patient every 2 hours    - Dangle patient 3 times a day  - Stand patient 3 times a day  - Ambulate patient 3 times a day  - Out of bed to chair 3 times a day   - Out of bed for meals 3 times a day  - Out of bed for toileting  - Record patient progress and toleration of activity level   Outcome: Progressing     Problem: DISCHARGE PLANNING  Goal: Discharge to home or other facility with appropriate resources  Description: INTERVENTIONS:  - Identify barriers to discharge w/patient and caregiver  - Arrange for needed discharge resources and transportation as appropriate  - Identify discharge learning needs (meds, wound care, etc )  - Arrange for interpretive services to assist at discharge as needed  - Refer to Case Management Department for coordinating discharge planning if the patient needs post-hospital services based on physician/advanced practitioner order or complex needs related to functional status, cognitive ability, or social support system  Outcome: Progressing     Problem: Knowledge Deficit  Goal: Patient/family/caregiver demonstrates understanding of disease process, treatment plan, medications, and discharge instructions  Description: Complete learning assessment and assess knowledge base    Interventions:  - Provide teaching at level of understanding  - Provide teaching via preferred learning methods  Outcome: Progressing

## 2023-04-23 LAB
ANION GAP SERPL CALCULATED.3IONS-SCNC: 8 MMOL/L (ref 4–13)
BASOPHILS # BLD AUTO: 0.04 THOUSANDS/ΜL (ref 0–0.1)
BASOPHILS NFR BLD AUTO: 1 % (ref 0–1)
BUN SERPL-MCNC: 30 MG/DL (ref 5–25)
CALCIUM SERPL-MCNC: 8.8 MG/DL (ref 8.4–10.2)
CHLORIDE SERPL-SCNC: 104 MMOL/L (ref 96–108)
CO2 SERPL-SCNC: 25 MMOL/L (ref 21–32)
CREAT SERPL-MCNC: 1.52 MG/DL (ref 0.6–1.3)
EOSINOPHIL # BLD AUTO: 0.17 THOUSAND/ΜL (ref 0–0.61)
EOSINOPHIL NFR BLD AUTO: 2 % (ref 0–6)
ERYTHROCYTE [DISTWIDTH] IN BLOOD BY AUTOMATED COUNT: 13.8 % (ref 11.6–15.1)
GFR SERPL CREATININE-BSD FRML MDRD: 48 ML/MIN/1.73SQ M
GLUCOSE P FAST SERPL-MCNC: 103 MG/DL (ref 65–99)
GLUCOSE SERPL-MCNC: 103 MG/DL (ref 65–140)
HCT VFR BLD AUTO: 37.2 % (ref 36.5–49.3)
HGB BLD-MCNC: 12 G/DL (ref 12–17)
IMM GRANULOCYTES # BLD AUTO: 0.05 THOUSAND/UL (ref 0–0.2)
IMM GRANULOCYTES NFR BLD AUTO: 1 % (ref 0–2)
LYMPHOCYTES # BLD AUTO: 2.01 THOUSANDS/ΜL (ref 0.6–4.47)
LYMPHOCYTES NFR BLD AUTO: 24 % (ref 14–44)
MCH RBC QN AUTO: 29.3 PG (ref 26.8–34.3)
MCHC RBC AUTO-ENTMCNC: 32.3 G/DL (ref 31.4–37.4)
MCV RBC AUTO: 91 FL (ref 82–98)
MONOCYTES # BLD AUTO: 1 THOUSAND/ΜL (ref 0.17–1.22)
MONOCYTES NFR BLD AUTO: 12 % (ref 4–12)
NEUTROPHILS # BLD AUTO: 5.28 THOUSANDS/ΜL (ref 1.85–7.62)
NEUTS SEG NFR BLD AUTO: 60 % (ref 43–75)
NRBC BLD AUTO-RTO: 0 /100 WBCS
PLATELET # BLD AUTO: 243 THOUSANDS/UL (ref 149–390)
PMV BLD AUTO: 9.9 FL (ref 8.9–12.7)
POTASSIUM SERPL-SCNC: 4.4 MMOL/L (ref 3.5–5.3)
PROCALCITONIN SERPL-MCNC: 0.31 NG/ML
RBC # BLD AUTO: 4.1 MILLION/UL (ref 3.88–5.62)
SODIUM SERPL-SCNC: 137 MMOL/L (ref 135–147)
WBC # BLD AUTO: 8.55 THOUSAND/UL (ref 4.31–10.16)

## 2023-04-23 RX ADMIN — HEPARIN SODIUM 5000 UNITS: 5000 INJECTION INTRAVENOUS; SUBCUTANEOUS at 14:00

## 2023-04-23 RX ADMIN — SODIUM CHLORIDE, SODIUM GLUCONATE, SODIUM ACETATE, POTASSIUM CHLORIDE, MAGNESIUM CHLORIDE, SODIUM PHOSPHATE, DIBASIC, AND POTASSIUM PHOSPHATE 125 ML/HR: .53; .5; .37; .037; .03; .012; .00082 INJECTION, SOLUTION INTRAVENOUS at 04:38

## 2023-04-23 RX ADMIN — HEPARIN SODIUM 5000 UNITS: 5000 INJECTION INTRAVENOUS; SUBCUTANEOUS at 05:10

## 2023-04-23 RX ADMIN — SODIUM CHLORIDE, SODIUM GLUCONATE, SODIUM ACETATE, POTASSIUM CHLORIDE, MAGNESIUM CHLORIDE, SODIUM PHOSPHATE, DIBASIC, AND POTASSIUM PHOSPHATE 125 ML/HR: .53; .5; .37; .037; .03; .012; .00082 INJECTION, SOLUTION INTRAVENOUS at 12:31

## 2023-04-23 RX ADMIN — HEPARIN SODIUM 5000 UNITS: 5000 INJECTION INTRAVENOUS; SUBCUTANEOUS at 22:17

## 2023-04-23 NOTE — UTILIZATION REVIEW
Initial Clinical Review    Admission: Date/Time/Statement:   Admission Orders (From admission, onward)     Ordered        04/22/23 1755  Place in Observation  Once                      Orders Placed This Encounter   Procedures   • Place in Observation     Standing Status:   Standing     Number of Occurrences:   1     Order Specific Question:   Level of Care     Answer:   Med Surg [16]     ED Arrival Information     Expected   -    Arrival   4/22/2023 15:35    Acuity   Urgent            Means of arrival   Walk-In    Escorted by   Spouse    Service   Hospitalist    Admission type   Emergency            Arrival complaint   SOB   nausea  gait issue           Chief Complaint   Patient presents with   • Shortness of Breath     Patient presents to the ED with c/o SOB and lightheadedness while mowing the grass today        Initial Presentation: 58 y o  male presents to ED from home due to lightheaded and SOB after mowing lawn today  Labs find SHITAL, BUN and creatinine is elevated 2 37 from baseline of 1-1 1   WBC elevated 16,  Troponin wnl  + tachycardia EKG Sinus tachycardia CXR no infiltrate DDimer elevated  Exam notes normal breath sounds  no increased WOB skin wrm and dry, brisk cap refill Normal heart sounds  Admitted as Observation to med surg for SOB, SHITAL, SIRS Plan IV hydration, hold lisinopril, check UA, monitor off antibiotics if no improvement in SHITAL with IVF consider renal US       ED Triage Vitals   Temperature Pulse Respirations Blood Pressure SpO2   04/22/23 1544 04/22/23 1542 04/22/23 1542 04/22/23 1542 04/22/23 1542   97 7 °F (36 5 °C) (!) 107 22 118/69 97 %      Temp Source Heart Rate Source Patient Position - Orthostatic VS BP Location FiO2 (%)   04/22/23 1544 04/22/23 1542 04/22/23 1545 04/22/23 1545 --   Oral Monitor Lying Right arm       Pain Score       04/22/23 1541       No Pain          Wt Readings from Last 1 Encounters:   04/23/23 127 kg (280 lb 10 3 oz)     Additional Vital Signs:      Date/Time Temp Pulse Resp BP MAP (mmHg) SpO2 O2 Device Patient Position - Orthostatic VS   04/22/23 23:09:01 98 7 °F (37 1 °C) 86 18 109/75 86 94 % None (Room air) Lying   04/22/23 19:34:28 99 7 °F (37 6 °C) -- -- 136/84 101 -- -- --   04/22/23 1823 99 °F (37 2 °C) 91 -- 143/96 -- -- -- Sitting   04/22/23 1800 -- 93 19 126/66 87 95 % None (Room air) Sitting   04/22/23 1730 -- 96 19 125/67 89 94 % None (Room air) Sitting   04/22/23 1700 -- 100 16 118/73 91 95 % None (Room air) Lying   04/22/23 1630 -- 97 15 94/64 73 96 % None (Room air) Lying   04/22/23 1610 -- -- -- -- -- -- None (Room air) --   04/22/23 1600 -- 105 19 118/67 84 97 % None (Room air) Lying       Pertinent Labs/Diagnostic Test Results:    4/22 EKG: Sinus Tachycardia    XR chest 1 view portable   ED Interpretation by Jonah Rebolledo DO (04/22 1655)   No acute infiltrate or congestive heart failure      Final Result by Armida Peck MD (04/22 2024)      No acute cardiopulmonary disease                 Workstation performed: [id number]         VAS lower limb venous duplex study, complete bilateral    (Results Pending)     Results from last 7 days   Lab Units 04/22/23  1802   SARS-COV-2  Negative     Results from last 7 days   Lab Units 04/23/23  0442 04/22/23  1604   WBC Thousand/uL 8 55 16 70*   HEMOGLOBIN g/dL 12 0 14 0   HEMATOCRIT % 37 2 43 8   PLATELETS Thousands/uL 243 282   NEUTROS ABS Thousands/µL 5 28 13 00*         Results from last 7 days   Lab Units 04/23/23  0442 04/22/23  1604   SODIUM mmol/L 137 134*   POTASSIUM mmol/L 4 4 5 0   CHLORIDE mmol/L 104 98   CO2 mmol/L 25 25   ANION GAP mmol/L 8 11   BUN mg/dL 30* 32*   CREATININE mg/dL 1 52* 2 37*   EGFR ml/min/1 73sq m 48 28   CALCIUM mg/dL 8 8 10 3*     Results from last 7 days   Lab Units 04/22/23  1604   AST U/L 28   ALT U/L 44   ALK PHOS U/L 62   TOTAL PROTEIN g/dL 7 8   ALBUMIN g/dL 4 7   TOTAL BILIRUBIN mg/dL 0 49         Results from last 7 days   Lab Units 04/23/23  6772 04/22/23  1604   GLUCOSE RANDOM mg/dL 103 93             No results found for: BETA-HYDROXYBUTYRATE                   Results from last 7 days   Lab Units 04/22/23  1931 04/22/23  1755 04/22/23  1604   HS TNI 0HR ng/L  --   --  14   HS TNI 2HR ng/L  --  13  --    HSTNI D2 ng/L  --  -1  --    HS TNI 4HR ng/L 14  --   --    HSTNI D4 ng/L 0  --   --      Results from last 7 days   Lab Units 04/22/23  1604   D-DIMER QUANTITATIVE ug/ml FEU 0 58*     Results from last 7 days   Lab Units 04/22/23  1604   PROTIME seconds 12 5   INR  0 87   PTT seconds 23         Results from last 7 days   Lab Units 04/23/23  0442 04/22/23  1604   PROCALCITONIN ng/ml 0 31* 0 21                 Results from last 7 days   Lab Units 04/22/23  1604   BNP pg/mL 9                             Results from last 7 days   Lab Units 04/22/23  1942   CLARITY UA  Clear   COLOR UA  Yellow   SPEC GRAV UA  1 015   PH UA  5 5   GLUCOSE UA mg/dl Negative   KETONES UA mg/dl Negative   BLOOD UA  Negative   PROTEIN UA mg/dl 30 (1+)*   NITRITE UA  Negative   BILIRUBIN UA  Negative   UROBILINOGEN UA (BE) mg/dl <2 0   LEUKOCYTES UA  Negative   WBC UA /hpf 1-2   RBC UA /hpf None Seen   BACTERIA UA /hpf Occasional   EPITHELIAL CELLS WET PREP /hpf Occasional   MUCUS THREADS  Occasional*     Results from last 7 days   Lab Units 04/22/23  1802   INFLUENZA A PCR  Negative   INFLUENZA B PCR  Negative   RSV PCR  Negative                                           ED Treatment:   Medication Administration from 04/22/2023 1535 to 04/22/2023 0816       Date/Time Order Dose Route Action Action by Comments     04/22/2023 1605 EDT sodium chloride 0 9 % bolus 1,000 mL 1,000 mL Intravenous New Bag Wes Donato RN --        Past Medical History:   Diagnosis Date   • Hypertension      Present on Admission:  • SHITAL (acute kidney injury) ([facility] [address] )  • HTN (hypertension)  • SIRS (systemic inflammatory response syndrome) ([facility])  • SOB (shortness of breath)      Admitting Diagnosis: Dehydration [E86 0]  Leukocytosis [D72 829]  Dyspnea [R06 00]  SOB (shortness of breath) [R06 02]  Near syncope [R55]  Acute kidney injury ([facility] [address] ) [N17 9]  Age/Sex: 58 y o  male  Admission Orders:  Scheduled Medications:  heparin (porcine), 5,000 Units, Subcutaneous, Q8H [address]      Continuous IV Infusions:  multi-electrolyte, 125 mL/hr, Intravenous, Continuous      PRN Meds:  acetaminophen, 650 mg, Oral, Q6H PRN  melatonin, 3 mg, Oral, Daily PRN  ondansetron, 4 mg, Intravenous, Q6H PRN        None    Network Utilization Review Department  ATTENTION: Please call with any questions or concerns to [phone] and carefully listen to the prompts so that you are directed to the right person  All voicemails are confidential   Minna Flores all requests for admission clinical reviews, approved or denied determinations and any other requests to dedicated fax number below belonging to the campus where the patient is receiving treatment   List of dedicated fax numbers for the Facilities:  [address] DENIALS (Administrative/Medical Necessity) [phone]   [address] (Maternity/NICU/Pediatrics) [phone]   [address] Charity Reed [phone]   [facility] [phone]   [address] [address] [address] [place] [address] Nikki Barber 28 [phone]   [address] [facility] [place] [address] 815 [facility] [phone]

## 2023-04-24 VITALS
BODY MASS INDEX: 38.7 KG/M2 | RESPIRATION RATE: 17 BRPM | WEIGHT: 270.3 LBS | SYSTOLIC BLOOD PRESSURE: 128 MMHG | TEMPERATURE: 98.2 F | HEART RATE: 87 BPM | OXYGEN SATURATION: 92 % | DIASTOLIC BLOOD PRESSURE: 87 MMHG | HEIGHT: 70 IN

## 2023-04-24 PROBLEM — R65.10 SIRS (SYSTEMIC INFLAMMATORY RESPONSE SYNDROME) (HCC): Status: RESOLVED | Noted: 2023-04-22 | Resolved: 2023-04-24

## 2023-04-24 PROBLEM — N17.9 AKI (ACUTE KIDNEY INJURY) (HCC): Status: RESOLVED | Noted: 2023-04-22 | Resolved: 2023-04-24

## 2023-04-24 PROBLEM — R06.02 SOB (SHORTNESS OF BREATH): Status: RESOLVED | Noted: 2023-04-22 | Resolved: 2023-04-24

## 2023-04-24 LAB
ANION GAP SERPL CALCULATED.3IONS-SCNC: 5 MMOL/L (ref 4–13)
ATRIAL RATE: 106 BPM
BUN SERPL-MCNC: 23 MG/DL (ref 5–25)
CALCIUM SERPL-MCNC: 9 MG/DL (ref 8.4–10.2)
CHLORIDE SERPL-SCNC: 104 MMOL/L (ref 96–108)
CO2 SERPL-SCNC: 29 MMOL/L (ref 21–32)
CREAT SERPL-MCNC: 1.12 MG/DL (ref 0.6–1.3)
GFR SERPL CREATININE-BSD FRML MDRD: 70 ML/MIN/1.73SQ M
GLUCOSE SERPL-MCNC: 99 MG/DL (ref 65–140)
P AXIS: 42 DEGREES
POTASSIUM SERPL-SCNC: 4.6 MMOL/L (ref 3.5–5.3)
PR INTERVAL: 148 MS
QRS AXIS: 44 DEGREES
QRSD INTERVAL: 72 MS
QT INTERVAL: 346 MS
QTC INTERVAL: 459 MS
SODIUM SERPL-SCNC: 138 MMOL/L (ref 135–147)
T WAVE AXIS: 44 DEGREES
VENTRICULAR RATE: 106 BPM

## 2023-04-24 RX ADMIN — HEPARIN SODIUM 5000 UNITS: 5000 INJECTION INTRAVENOUS; SUBCUTANEOUS at 06:09

## 2023-04-24 NOTE — UTILIZATION REVIEW
Continued Stay Review  Admission: Date/Time/Statement:  4/22/23 1755 observation     Date: 4/24/23                          Current Patient Class: observation   Current Level of Care:med surg     HPI:62 y o  male initially admitted on 4/22/23 to observation due to acute renal failure, prerenal from dehydration  Baseline creatinine of 1  Admission creatinine 2 37  Treated with IVF, home lisinopril on hold  Assessment/Plan:   4/24/23 observation     Vital Signs:   04/24/23 07:38:21 98 2 °F (36 8 °C) -- 17 128/87 101 92 % -- --   04/23/23 20:02:39 98 6 °F (37 °C) 87 16 128/88 101 94 % None (Room air) Sitting   04/23/23 14:41:22 99 °F (37 2 °C) 85 18 111/73 86 94 % None (Room air) Lying   04/22/23 23:09:01 98 7 °F (37 1 °C) 86 18 109/75 86 94 % None (Room air) Lying   04/22/23 19:34:28 99 7 °F (37 6 °C) -- -- 136/84 101 -- --      Pertinent Labs/Diagnostic Results:   VAS lower limb venous duplex study, complete bilateral   Final Result by Ananda Blunt DO (04/23 1750)   RIGHT LOWER LIMB:  No evidence of acute or chronic deep vein thrombosis  LEFT LOWER LIMB:  No evidence of acute or chronic deep vein thrombosis   XR chest 1 view portable   ED Interpretation by Carol Swain DO (04/22 1655)   No acute infiltrate or congestive heart failure      Final Result by Olvin Ashraf MD (04/22 2024)      No acute cardiopulmonary disease                 Workstation performed: [id number]           Results from last 7 days   Lab Units 04/22/23  1802   SARS-COV-2  Negative     Results from last 7 days   Lab Units 04/23/23  0442 04/22/23  1604   WBC Thousand/uL 8 55 16 70*   HEMOGLOBIN g/dL 12 0 14 0   HEMATOCRIT % 37 2 43 8   PLATELETS Thousands/uL 243 282   NEUTROS ABS Thousands/µL 5 28 13 00*     Results from last 7 days   Lab Units 04/24/23  0608 04/23/23  0442 04/22/23  1604   SODIUM mmol/L 138 137 134*   POTASSIUM mmol/L 4 6 4 4 5 0   CHLORIDE mmol/L 104 104 98   CO2 mmol/L 29 25 25   ANION GAP mmol/L 5 8 11   BUN mg/dL 23 30* 32*   CREATININE mg/dL 1 12 1 52* 2 37*   EGFR ml/min/1 73sq m 70 48 28   CALCIUM mg/dL 9 0 8 8 10 3*     Results from last 7 days   Lab Units 04/22/23  1604   AST U/L 28   ALT U/L 44   ALK PHOS U/L 62   TOTAL PROTEIN g/dL 7 8   ALBUMIN g/dL 4 7   TOTAL BILIRUBIN mg/dL 0 49     Results from last 7 days   Lab Units 04/24/23  0608 04/23/23  0442 04/22/23  1604   GLUCOSE RANDOM mg/dL 99 103 93     Results from last 7 days   Lab Units 04/22/23  1931 04/22/23  1755 04/22/23  1604   HS TNI 0HR ng/L  --   --  14   HS TNI 2HR ng/L  --  13  --    HSTNI D2 ng/L  --  -1  --    HS TNI 4HR ng/L 14  --   --    HSTNI D4 ng/L 0  --   --      Results from last 7 days   Lab Units 04/22/23  1604   D-DIMER QUANTITATIVE ug/ml FEU 0 58*     Results from last 7 days   Lab Units 04/22/23  1604   PROTIME seconds 12 5   INR  0 87   PTT seconds 23     Results from last 7 days   Lab Units 04/23/23  0442 04/22/23  1604   PROCALCITONIN ng/ml 0 31* 0 21     Results from last 7 days   Lab Units 04/22/23  1604   BNP pg/mL 9     Results from last 7 days   Lab Units 04/22/23  1942   CLARITY UA  Clear   COLOR UA  Yellow   SPEC GRAV UA  1 015   PH UA  5 5   GLUCOSE UA mg/dl Negative   KETONES UA mg/dl Negative   BLOOD UA  Negative   PROTEIN UA mg/dl 30 (1+)*   NITRITE UA  Negative   BILIRUBIN UA  Negative   UROBILINOGEN UA (BE) mg/dl <2 0   LEUKOCYTES UA  Negative   WBC UA /hpf 1-2   RBC UA /hpf None Seen   BACTERIA UA /hpf Occasional   EPITHELIAL CELLS WET PREP /hpf Occasional   MUCUS THREADS  Occasional*     Results from last 7 days   Lab Units 04/22/23  1802   INFLUENZA A PCR  Negative   INFLUENZA B PCR  Negative   RSV PCR  Negative         Medications:   Scheduled Medications:  heparin (porcine), 5,000 Units, Subcutaneous, Q8H [address]      Continuous IV Infusions:  multi-electrolyte (PLASMALYTE-A/ISOLYTE-S PH 7 4) IV solution  Rate: 125 mL/hr Dose: 125 mL/hr  Freq: Continuous Route: IV  Indications of Use: IV Hydration  Last Dose: 125 mL/hr (04/23/23 1231)  Start: 04/22/23 1900 End: 04/23/23 1830     PRN Meds: not used   acetaminophen, 650 mg, Oral, Q6H PRN  melatonin, 3 mg, Oral, Daily PRN  ondansetron, 4 mg, Intravenous, Q6H PRN        Discharge Plan: to be determined  Network Utilization Review Department  ATTENTION: Please call with any questions or concerns to [phone] and carefully listen to the prompts so that you are directed to the right person  All voicemails are confidential   Bell Deluca all requests for admission clinical reviews, approved or denied determinations and any other requests to dedicated fax number below belonging to the campus where the patient is receiving treatment   List of dedicated fax numbers for the Facilities:  [address] DENIALS (Administrative/Medical Necessity) [phone]   [address] (Maternity/NICU/Pediatrics) [phone]   [address] Char[address] Reed [phone]   [facility] Brian  [phone]   1306 [facility] [address] [place] [place] [address] Nikki Barber 28 [phone]   [address] [place] Olav [facility] 134 815 [facility] [phone]

## 2023-04-24 NOTE — DISCHARGE INSTR - AVS FIRST PAGE
Follow-up with PCP within 1 week for posthospitalization follow-up  Please have repeat blood work completed in 1 week to follow-up kidney function    Return to the emergency department for further evaluation with any chest pain/palpitations, shortness of breath, nausea/vomiting, abdominal pain, fever/chills, decreased urination, dizziness/lightheadedness

## 2023-04-24 NOTE — ASSESSMENT & PLAN NOTE
·  Baseline Cr 1-1 1  · Cr on admission 2 37  · Suspected pre-renal etiology/acute dehydration  · UA negative UTI  · Creatinine back to baseline, 1 12  · Resume lisinopril  · Repeat BMP in 1 week

## 2023-04-24 NOTE — CASE MANAGEMENT
Case Management Progress Note    Patient name Walker Li  Location [address] 316/-07 MRN [id number]  : 1960 Date 2023       LOS (days): 0  Geometric Mean LOS (GMLOS) (days):   Days to GMLOS:        OBJECTIVE:        Current admission status: Observation  Preferred Pharmacy:   [id number], [address] [address]  Phone: [phone] Fax: [phone]    Primary Care Provider: No primary care provider on file  Primary Insurance: BLUE CROSS  Secondary Insurance:     PROGRESS NOTE:    CM was informed that pt is medically stable for dc today  CM spoke to pt's bedside RN Cuauhtemoc Melchor who reports pt is independent  No CM needs  Pt's family to transport home at dc

## 2023-04-24 NOTE — DISCHARGE SUMMARY
[facility]  Discharge- Schulz Cricket 1960, 58 y o  male MRN: [id number]  Unit/Bed#: -01 Encounter: [id number]  Primary Care Provider: No primary care provider on file  Date and time admitted to hospital: 4/22/2023  3:37 PM    * SOB (shortness of breath)-resolved as of 4/24/2023  Assessment & Plan  · Presented to ED after becoming lightheaded with SOB while mowing lawn  Sx resolved, stable on RA  · Covid/flu/rsv pending   · Meeting SIRS however suspect due to dehydration > infection   · Opponent negative x3  · BNP WNL   · D dimer slightly elevated to 0 58 however with age adjustment, low likelihood VTE  · Venous duplex negative DVT  · Suspect symptoms due to pre-syncope in setting of acute dehydration with SHITAL  · Resolved    HTN (hypertension)  Assessment & Plan  · Creatinine back to baseline  · Resume lisinopril    SIRS (systemic inflammatory response syndrome) (HCC)-resolved as of 4/24/2023  Assessment & Plan  · POA, SIRS; WBC 16, tachycardia   · No identifiable source currently, does not meet sepsis  · Procalcitonin negative  · monitor off abx  · UA negative UTI  · Resolved    SHITAL (acute kidney injury) (HCC)-resolved as of 4/24/2023  Assessment & Plan  ·  Baseline Cr 1-1 1  · Cr on admission 2 37  · Suspected pre-renal etiology/acute dehydration  · UA negative UTI  · Creatinine back to baseline, 1 12  · Resume lisinopril  · Repeat BMP in 1 week    Medical Problems     Resolved Problems  Date Reviewed: 4/24/2023          Resolved    SHITAL (acute kidney injury) ([facility] [address] ) 4/24/2023     Resolved by  John Mares PA-C    SIRS (systemic inflammatory response syndrome) ([facility] [address] ) 4/24/2023     Resolved by  John Mares PA-C    * (Principal) SOB (shortness of breath) 4/24/2023     Resolved by  John Mares PA-C        Discharging Physician / Practitioner: John Mares PA-C  PCP: No primary care provider on file    Admission Date:   Admission Orders (From admission, onward)     Ordered        04/22/23 1755  Place in Observation  Once                      Discharge Date: 04/24/23    Consultations During Hospital Stay:  · None    Procedures Performed:   · None    Significant Findings / Test Results:   · CXR: Negative for acute cardiopulmonary disease  · Venous duplex negative for DVT  · UA negative UTI  · COVID/influenza/RSV negative  · Troponin negative x3  · BNP WNL    Incidental Findings:   · None    Test Results Pending at Discharge (will require follow up): · None     Outpatient Tests Requested:  · Repeat BMP in 1 week    Complications: None    Reason for Admission: Acute kidney injury    Hospital Course:   Anne Mathew is a 58 y o  male patient who originally presented to the hospital on 4/22/2023 due to shortness of breath  Past medical history significant for hypertension, obesity  Patient presented to the emergency department initially due to shortness of breath and lightheadedness  Patient had reportedly been doing lawn work throughout the day  At one point patient developed lightheadedness/dizziness, nausea but no vomiting  On admission was found to have creatinine of 2 37 and was started on IV hydration  Lisinopril was held  Venous duplex was negative for DVT and chest x-ray was without cardiopulmonary disease  Patient did meet SIRS criteria on admission for leukocytosis and tachycardia but this was likely related to dehydration  Patient's renal function returned to baseline prior to discharge  On day of discharge patient was hemodynamically stable and verbalized understanding for requested outpatient follow-up  Patient will have repeat BMP completed in 1 week  Please see above list of diagnoses and related plan for additional information  Condition at Discharge: stable    Discharge Day Visit / Exam:   Subjective: Denies any complaints, looking forward to discharge today    Vitals: Blood Pressure: 128/87 (04/24/23 0738)  Pulse: 87 (04/23/23 "2002)  Temperature: 98 2 °F (36 8 °C) (04/24/23 0738)  Temp Source: Oral (04/23/23 2002)  Respirations: 17 (04/24/23 0738)  Height: 5' 10\" (177 8 cm) (04/22/23 1541)  Weight - Scale: 123 kg (270 lb 4 8 oz) (04/24/23 0600)  SpO2: 92 % (04/24/23 0738)  Exam:   Physical Exam  Vitals and nursing note reviewed  Constitutional:       General: He is not in acute distress  Appearance: He is well-developed  Comments: No acute distress   HENT:      Head: Normocephalic and atraumatic  Eyes:      General: No scleral icterus  Extraocular Movements: Extraocular movements intact  Conjunctiva/sclera: Conjunctivae normal    Cardiovascular:      Rate and Rhythm: Normal rate and regular rhythm  Heart sounds: No murmur heard  Pulmonary:      Effort: Pulmonary effort is normal  No respiratory distress  Breath sounds: Normal breath sounds  No wheezing, rhonchi or rales  Abdominal:      General: Bowel sounds are normal       Palpations: Abdomen is soft  Tenderness: There is no abdominal tenderness  There is no guarding or rebound  Musculoskeletal:         General: No swelling  Cervical back: Normal range of motion  Comments: Able to move upper/lower extremities bilaterally, no edema   Skin:     General: Skin is warm and dry  Capillary Refill: Capillary refill takes less than 2 seconds  Neurological:      Mental Status: He is alert and oriented to person, place, and time  Psychiatric:         Mood and Affect: Mood normal          Speech: Speech normal          Behavior: Behavior normal           Discussion with Family: Patient declined call to   Discharge instructions/Information to patient and family:   See after visit summary for information provided to patient and family  Provisions for Follow-Up Care:  See after visit summary for information related to follow-up care and any pertinent home health orders         Disposition:   Home    Planned Readmission: " None     Discharge Statement:  I spent 60 minutes discharging the patient  This time was spent on the day of discharge  I had direct contact with the patient on the day of discharge  Greater than 50% of the total time was spent examining patient, answering all patient questions, arranging and discussing plan of care with patient as well as directly providing post-discharge instructions  Additional time then spent on discharge activities  Discharge Medications:  See after visit summary for reconciled discharge medications provided to patient and/or family        **Please Note: This note may have been constructed using a voice recognition system**

## 2023-04-24 NOTE — ASSESSMENT & PLAN NOTE
· Presented to ED after becoming lightheaded with SOB while mowing lawn  Sx resolved, stable on RA    · Covid/flu/rsv pending   · Meeting SIRS however suspect due to dehydration > infection   · Opponent negative x3  · BNP WNL   · D dimer slightly elevated to 0 58 however with age adjustment, low likelihood VTE  · Venous duplex negative DVT  · Suspect symptoms due to pre-syncope in setting of acute dehydration with SHITAL  · Resolved

## 2023-04-24 NOTE — ASSESSMENT & PLAN NOTE
· POA, SIRS; WBC 16, tachycardia   · No identifiable source currently, does not meet sepsis  · Procalcitonin negative  · monitor off abx  · UA negative UTI  · Resolved